# Patient Record
(demographics unavailable — no encounter records)

---

## 2025-04-02 NOTE — ASSESSMENT
[FreeTextEntry1] : I this is a young white female with a past medical history of mild multinodular goiter, hypertension and anxiety who presents for routine follow-up.  Patient had her gallbladder removed last year and she still complains of mild abdominal discomfort otherwise she is healthy and physically active and exercises on a regular basis.  Her last blood test performed on 31 January 2025 shows a normal TSH level of 1.10 and a free T4 of 1.2.  Her blood test also showed elevated total: Of 207 and LDL of 130 complete metabolic panel was normal.  Sonogram of the thyroid performed in April 2004 revealed bilateral benign-appearing thyroid nodules.  Patient is clinically euthyroid.  Recommendation 1 I have advised the patient obtain a follow-up sonogram of the right in order to monitor the size of the thyroid nodules.  Patient does not require any fine-needle aspiration at this time but if we see any significant change in the size of the structure.  We may consider performing fine-needle aspiration biopsy 2.  If the above study is stable then the patient will return for follow-up evaluation in 1 years time with a repeat blood test. 3.  The above plan was discussed in detail with the patient thank you

## 2025-04-02 NOTE — HISTORY OF PRESENT ILLNESS
[FreeTextEntry1] : 66-year-old female with a medical history of thyroid nodules present for a routine follow up visit. Patient denies any significant symptoms of chest palpitations, muscle camps, hair loss, sob, abdominal pain, nausea or vomiting. She is compliant with her diet; weight has been stable. Her medications include Lisinopril 10mg, Metoprolol Succinate ER 25mg and Sertraline HCI 00mg. Her circulation of the lower extremities remain stable bilateral, denies any paresthesia. Patient states that her energy levels are preserved, and she is physically active. Patient has not noticed any changes in her vision, denies any difficulty swallowing, hoarseness of the voice. Patient does remain clinically stable.

## 2025-04-02 NOTE — PHYSICAL EXAM
[Alert] : alert [Well Nourished] : well nourished [No Acute Distress] : no acute distress [Well Developed] : well developed [Normal Sclera/Conjunctiva] : normal sclera/conjunctiva [EOMI] : extra ocular movement intact [No Proptosis] : no proptosis [Normal Oropharynx] : the oropharynx was normal [No Thyroid Nodules] : no palpable thyroid nodules [No Respiratory Distress] : no respiratory distress [No Accessory Muscle Use] : no accessory muscle use [Clear to Auscultation] : lungs were clear to auscultation bilaterally [Normal S1, S2] : normal S1 and S2 [Normal Rate] : heart rate was normal [Regular Rhythm] : with a regular rhythm [No Edema] : no peripheral edema [Pedal Pulses Normal] : the pedal pulses are present [Normal Bowel Sounds] : normal bowel sounds [Not Tender] : non-tender [Not Distended] : not distended [Soft] : abdomen soft [Normal Anterior Cervical Nodes] : no anterior cervical lymphadenopathy [Normal Posterior Cervical Nodes] : no posterior cervical lymphadenopathy [No Spinal Tenderness] : no spinal tenderness [Spine Straight] : spine straight [No Stigmata of Cushings Syndrome] : no stigmata of Cushings Syndrome [Normal Gait] : normal gait [Normal Strength/Tone] : muscle strength and tone were normal [No Rash] : no rash [Normal Reflexes] : deep tendon reflexes were 2+ and symmetric [No Tremors] : no tremors [Oriented x3] : oriented to person, place, and time [Acanthosis Nigricans] : no acanthosis nigricans [de-identified] : Bilaterally enlarged thyroid gland with palpable nodule on the right middle and lower pole smooth and nontender and a smaller nodule on the left lower pole which is also smooth and nontender no associated lymph nodes.